# Patient Record
Sex: FEMALE | Race: OTHER | NOT HISPANIC OR LATINO | URBAN - METROPOLITAN AREA
[De-identification: names, ages, dates, MRNs, and addresses within clinical notes are randomized per-mention and may not be internally consistent; named-entity substitution may affect disease eponyms.]

---

## 2023-02-17 ENCOUNTER — EMERGENCY (EMERGENCY)
Facility: HOSPITAL | Age: 53
LOS: 1 days | Discharge: HOME | End: 2023-02-17
Attending: EMERGENCY MEDICINE
Payer: COMMERCIAL

## 2023-02-17 VITALS
RESPIRATION RATE: 18 BRPM | DIASTOLIC BLOOD PRESSURE: 84 MMHG | TEMPERATURE: 98 F | SYSTOLIC BLOOD PRESSURE: 178 MMHG | HEART RATE: 79 BPM | OXYGEN SATURATION: 100 % | HEIGHT: 62 IN | WEIGHT: 203.93 LBS

## 2023-02-17 PROCEDURE — 70450 CT HEAD/BRAIN W/O DYE: CPT | Mod: 26,MG

## 2023-02-17 PROCEDURE — 70450 CT HEAD/BRAIN W/O DYE: CPT | Mod: MG

## 2023-02-17 PROCEDURE — 99285 EMERGENCY DEPT VISIT HI MDM: CPT | Mod: 25

## 2023-02-17 PROCEDURE — 99285 EMERGENCY DEPT VISIT HI MDM: CPT

## 2023-02-17 PROCEDURE — 90715 TDAP VACCINE 7 YRS/> IM: CPT

## 2023-02-17 PROCEDURE — 90471 IMMUNIZATION ADMIN: CPT

## 2023-02-17 PROCEDURE — G1004: CPT

## 2023-02-17 PROCEDURE — 13132 CMPLX RPR F/C/C/M/N/AX/G/H/F: CPT

## 2023-02-17 RX ORDER — TETANUS TOXOID, REDUCED DIPHTHERIA TOXOID AND ACELLULAR PERTUSSIS VACCINE, ADSORBED 5; 2.5; 8; 8; 2.5 [IU]/.5ML; [IU]/.5ML; UG/.5ML; UG/.5ML; UG/.5ML
0.5 SUSPENSION INTRAMUSCULAR ONCE
Refills: 0 | Status: COMPLETED | OUTPATIENT
Start: 2023-02-17 | End: 2023-02-17

## 2023-02-17 RX ADMIN — TETANUS TOXOID, REDUCED DIPHTHERIA TOXOID AND ACELLULAR PERTUSSIS VACCINE, ADSORBED 0.5 MILLILITER(S): 5; 2.5; 8; 8; 2.5 SUSPENSION INTRAMUSCULAR at 10:47

## 2023-02-17 NOTE — ED PROVIDER NOTE - PROGRESS NOTE DETAILS
Jay Zimmerman consulted for repair Jay Travis- lac repaired by Dr. Zimmerman, will dc with f/u. return precautions and suture care instructions given.

## 2023-02-17 NOTE — ED ADULT NURSE NOTE - OBJECTIVE STATEMENT
Pt came in s/p fall while taking a shower. Pt hit head on the tub. No distress. Breathing easy and non labored. Pt ambulatory. Denies any LOC

## 2023-02-17 NOTE — CONSULT NOTE ADULT - SUBJECTIVE AND OBJECTIVE BOX
Requested.  See dictated note.  Tolerated repair.  Consider head CT and discharge from neuro point of view when cleared by ED attending.  F/u next wk.

## 2023-02-17 NOTE — ED PROVIDER NOTE - OBJECTIVE STATEMENT
51yo F w/ hx asthma presenting with forehead lac. Pt slipped in the shower, tried to break fall with furniture but fell and hit forehead in tub. No prodrome, loc or AC use. No dizziness, n/v, HA, neck pain. Pt ambulatory without assistance since fall.

## 2023-02-17 NOTE — ED PROVIDER NOTE - CARE PROVIDER_API CALL
Guy Zimmerman (MD)  Plastic Surgery  [address] [place] [address], [address]  [place], [place] [id number]  Phone: [phone]  Fax: [phone]  Follow Up Time: 7-10 Days

## 2023-02-17 NOTE — ED PROVIDER NOTE - CLINICAL SUMMARY MEDICAL DECISION MAKING FREE TEXT BOX
51yo F w/ hx asthma presenting with forehead lac. Pt slipped in the shower, tried to break fall with furniture but fell and hit forehead in tub. No prodrome, loc or AC use. No dizziness, n/v, HA, neck pain. Pt ambulatory without assistance since fall. Exam shows 5cm forehead lac, no focal neuro deficits, well appearing. No indication for CT at this time. Pt requesting plastics to repair. Will contact plastics. 53yo F w/ hx asthma presenting with forehead lac. Pt slipped in the shower, tried to break fall with furniture but fell and hit forehead in tub. No prodrome, loc or AC use. No dizziness, n/v, HA, neck pain. Pt ambulatory without assistance since fall. Exam shows 5cm forehead lac, no focal neuro deficits, well appearing. No indication for CT at this time. Pt requesting plastics to repair. Will contact plastics.    Attending MD Mckeon: 53 yo female on no blood thinners s/p mechanical trip and fall with forehead laceration. No LOC, neck pain, vision changes, nausea, vomiting or headache.  Patient requesting plastic surgery for laceration repair.  Plastic attending requested and ordered head CT.

## 2023-02-17 NOTE — ED PROVIDER NOTE - ATTENDING CONTRIBUTION TO CARE
Attending MD Mckeon:  I personally have seen and examined this patient.  Resident note reviewed and agree on plan of care and except where noted.  Please see my MDM for further details.

## 2023-02-17 NOTE — ED PROVIDER NOTE - NSFOLLOWUPINSTRUCTIONS_ED_ALL_ED_FT
You were seen in the ER for laceration. The plastic surgeon repaired your laceration. Please follow up with them in 1 week. Keep your suture repair clean and dry. Do not scrub or soak the area.    Please follow up with your primary care doctor.    Please return to the ER if you have worsening symptoms including fever, chest pain, shortness of breath, abdominal pain, nausea, vomiting, diarrhea, weakness or lightheadedness/fainting.

## 2023-02-17 NOTE — ED PROVIDER NOTE - PHYSICAL EXAMINATION
General appearance: NAD, conversant, afebrile    Eyes: anicteric sclerae, MARINO, EOMI   HENT: Atraumatic; oropharynx clear, MMM and no ulcerations, no pharyngeal erythema or exudate   Neck: Trachea midline; Full range of motion, supple   Pulm: CTA bl, normal respiratory effort and no intercostal retractions, normal work of breathing   CV: RRR, No murmurs, rubs, or gallops. 2+ peripheral pulses.   Abdomen: Soft, non-tender, non-distended; no guarding or rebound   Extremities: No peripheral edema or extremity lymphadenopathy. 5/5 strength in all four extremities.   Skin: 5cm laceration to forehead, no active bleeding. Dry, normal temperature, turgor and texture; no rash, ulcers or subcutaneous nodules   Psych: Appropriate affect, cooperative; alert and oriented to person, place and time

## 2023-02-17 NOTE — ED ADULT TRIAGE NOTE - CHIEF COMPLAINT QUOTE
slip in shower sustained forehead lac - sent in by urgent care for sutures  no LOC, no blood thinners, not bleeding on arrival to ED

## 2023-02-17 NOTE — ED PROVIDER NOTE - PATIENT PORTAL LINK FT
You can access the FollowMyHealth Patient Portal offered by [facility] by registering at the following website: [email or web address]. By joining [facility]’s FollowMyHealth portal, you will also be able to view your health information using other applications (apps) compatible with our system.